# Patient Record
Sex: FEMALE | Race: BLACK OR AFRICAN AMERICAN | NOT HISPANIC OR LATINO | Employment: UNEMPLOYED | URBAN - METROPOLITAN AREA
[De-identification: names, ages, dates, MRNs, and addresses within clinical notes are randomized per-mention and may not be internally consistent; named-entity substitution may affect disease eponyms.]

---

## 2023-03-20 ENCOUNTER — TELEPHONE (OUTPATIENT)
Dept: PRIMARY CARE | Facility: CLINIC | Age: 63
End: 2023-03-20
Payer: COMMERCIAL

## 2023-03-20 DIAGNOSIS — B37.9 MONILIA INFECTION: Primary | ICD-10-CM

## 2023-03-20 DIAGNOSIS — R60.9 EDEMA, UNSPECIFIED TYPE: Primary | ICD-10-CM

## 2023-03-20 RX ORDER — NYSTATIN 100000 [USP'U]/G
POWDER TOPICAL 2 TIMES DAILY
Qty: 30 G | Refills: 1 | Status: SHIPPED | OUTPATIENT
Start: 2023-03-20 | End: 2024-03-19

## 2023-03-20 RX ORDER — FUROSEMIDE 20 MG/1
20 TABLET ORAL 2 TIMES DAILY
Qty: 60 TABLET | Refills: 0 | Status: SHIPPED | OUTPATIENT
Start: 2023-03-20 | End: 2024-03-19

## 2023-03-20 RX ORDER — AMLODIPINE BESYLATE 5 MG/1
5 TABLET ORAL 2 TIMES DAILY
COMMUNITY
Start: 2023-03-01

## 2023-03-20 RX ORDER — FUROSEMIDE 40 MG/1
1 TABLET ORAL DAILY
COMMUNITY
Start: 2022-06-07 | End: 2023-03-20 | Stop reason: ALTCHOICE

## 2023-03-20 RX ORDER — POTASSIUM CHLORIDE 1.5 G/1.58G
20 POWDER, FOR SOLUTION ORAL 2 TIMES DAILY
Qty: 60 PACKET | Refills: 0 | Status: SHIPPED | OUTPATIENT
Start: 2023-03-20 | End: 2024-03-19

## 2023-03-21 DIAGNOSIS — I10 HYPERTENSION, UNSPECIFIED TYPE: Primary | ICD-10-CM

## 2023-03-21 DIAGNOSIS — E03.9 HYPOTHYROIDISM, UNSPECIFIED TYPE: ICD-10-CM

## 2023-03-21 NOTE — PROGRESS NOTES
Subjective   Patient ID: Nkechi Hall is a 63 y.o. female who presents for No chief complaint on file..    HPI     Review of Systems    Objective   There were no vitals taken for this visit.    Physical Exam    Assessment/Plan

## 2023-03-23 PROBLEM — H04.123 DRY EYES: Status: ACTIVE | Noted: 2023-03-23

## 2023-03-23 PROBLEM — R07.89 ATYPICAL CHEST PAIN: Status: ACTIVE | Noted: 2023-03-23

## 2023-03-23 PROBLEM — I10 HYPERTENSION: Status: ACTIVE | Noted: 2023-03-23

## 2023-03-23 PROBLEM — R06.02 SHORTNESS OF BREATH ON EXERTION: Status: ACTIVE | Noted: 2023-03-23

## 2023-03-23 PROBLEM — J30.9 ALLERGIC RHINITIS: Status: ACTIVE | Noted: 2023-03-23

## 2023-03-23 PROBLEM — S16.1XXD NECK STRAIN, SUBSEQUENT ENCOUNTER: Status: ACTIVE | Noted: 2023-03-23

## 2023-03-23 PROBLEM — I87.8 VENOUS STASIS: Status: ACTIVE | Noted: 2023-03-23

## 2023-03-23 PROBLEM — R10.84 ABDOMINAL PAIN, CHRONIC, GENERALIZED: Status: ACTIVE | Noted: 2023-03-23

## 2023-03-23 PROBLEM — G89.29 CHRONIC PAIN: Status: ACTIVE | Noted: 2023-03-23

## 2023-03-23 PROBLEM — I70.0 ATHEROSCLEROSIS OF AORTA (CMS-HCC): Status: ACTIVE | Noted: 2023-03-23

## 2023-03-23 PROBLEM — M79.641 PAIN OF RIGHT HAND: Status: ACTIVE | Noted: 2023-03-23

## 2023-03-23 PROBLEM — N64.4 BREAST PAIN, LEFT: Status: ACTIVE | Noted: 2023-03-23

## 2023-03-23 PROBLEM — M54.17 LUMBOSACRAL NEURITIS: Status: ACTIVE | Noted: 2023-03-23

## 2023-03-23 PROBLEM — M10.9 GOUT: Status: ACTIVE | Noted: 2023-03-23

## 2023-03-23 PROBLEM — M25.519 SHOULDER PAIN: Status: ACTIVE | Noted: 2023-03-23

## 2023-03-23 PROBLEM — M79.89 SWELLING OF LOWER EXTREMITY: Status: ACTIVE | Noted: 2023-03-23

## 2023-03-23 PROBLEM — M54.50 LOW BACK PAIN: Status: ACTIVE | Noted: 2023-03-23

## 2023-03-23 PROBLEM — M96.1 POSTLAMINECTOMY SYNDROME OF LUMBOSACRAL REGION: Status: ACTIVE | Noted: 2023-03-23

## 2023-03-23 PROBLEM — M79.7 FIBROMYALGIA: Status: ACTIVE | Noted: 2023-03-23

## 2023-03-23 PROBLEM — E04.2 MULTINODULAR GOITER: Status: ACTIVE | Noted: 2023-03-23

## 2023-03-23 PROBLEM — K76.89 LIVER CYST: Status: ACTIVE | Noted: 2023-03-23

## 2023-03-23 PROBLEM — R07.89 CHEST PAIN, MUSCULOSKELETAL: Status: ACTIVE | Noted: 2023-03-23

## 2023-03-23 PROBLEM — J04.0 REFLUX LARYNGITIS: Status: ACTIVE | Noted: 2023-03-23

## 2023-03-23 PROBLEM — S69.91XA INJURY OF RIGHT HAND: Status: ACTIVE | Noted: 2023-03-23

## 2023-03-23 PROBLEM — N95.1 VAGINAL DRYNESS, MENOPAUSAL: Status: ACTIVE | Noted: 2023-03-23

## 2023-03-23 PROBLEM — K21.9 REFLUX LARYNGITIS: Status: ACTIVE | Noted: 2023-03-23

## 2023-03-23 PROBLEM — R06.09 DOE (DYSPNEA ON EXERTION): Status: ACTIVE | Noted: 2023-03-23

## 2023-03-23 PROBLEM — D57.3 SICKLE CELL TRAIT (CMS-HCC): Status: ACTIVE | Noted: 2023-03-23

## 2023-03-23 PROBLEM — M54.2 CERVICALGIA: Status: ACTIVE | Noted: 2023-03-23

## 2023-03-23 PROBLEM — I27.20 PULMONARY HYPERTENSION, MILD (MULTI): Status: ACTIVE | Noted: 2023-03-23

## 2023-03-23 PROBLEM — G89.29 ABDOMINAL PAIN, CHRONIC, GENERALIZED: Status: ACTIVE | Noted: 2023-03-23

## 2023-03-23 PROBLEM — E87.6 HYPOKALEMIA: Status: ACTIVE | Noted: 2023-03-23

## 2023-03-23 PROBLEM — M54.16 LEFT LUMBAR RADICULOPATHY: Status: ACTIVE | Noted: 2023-03-23

## 2023-03-23 PROBLEM — M17.0 PRIMARY OSTEOARTHRITIS OF BOTH KNEES: Status: ACTIVE | Noted: 2023-03-23

## 2023-03-23 PROBLEM — M26.649 TMJ ARTHRITIS: Status: ACTIVE | Noted: 2023-03-23

## 2023-03-23 PROBLEM — M75.81 TENDINITIS OF RIGHT ROTATOR CUFF: Status: ACTIVE | Noted: 2023-03-23

## 2023-03-23 PROBLEM — M48.02 CERVICAL STENOSIS OF SPINE: Status: ACTIVE | Noted: 2023-03-23

## 2023-03-23 PROBLEM — M19.90 OSTEOARTHRITIS: Status: ACTIVE | Noted: 2023-03-23

## 2023-03-23 PROBLEM — M96.1 LUMBAR POSTLAMINECTOMY SYNDROME: Status: ACTIVE | Noted: 2023-03-23

## 2023-03-23 PROBLEM — E01.0 THYROMEGALY: Status: ACTIVE | Noted: 2023-03-23

## 2023-03-23 PROBLEM — M25.569 JOINT PAIN, KNEE: Status: ACTIVE | Noted: 2023-03-23

## 2023-03-23 PROBLEM — R91.1 PULMONARY NODULE: Status: ACTIVE | Noted: 2023-03-23

## 2023-03-23 PROBLEM — R68.81 EARLY SATIETY: Status: ACTIVE | Noted: 2023-03-23

## 2023-03-23 PROBLEM — M89.8X1: Status: ACTIVE | Noted: 2023-03-23

## 2023-03-23 PROBLEM — S46.811A STRAIN OF RIGHT TRAPEZIUS MUSCLE: Status: ACTIVE | Noted: 2023-03-23

## 2023-03-23 PROBLEM — D64.9 ANEMIA: Status: ACTIVE | Noted: 2023-03-23

## 2023-03-23 PROBLEM — M54.16 RIGHT LUMBAR RADICULOPATHY: Status: ACTIVE | Noted: 2023-03-23

## 2023-03-23 PROBLEM — L30.9 DERMATITIS, ECZEMATOID: Status: ACTIVE | Noted: 2023-03-23

## 2023-03-23 PROBLEM — M62.81 MUSCLE WEAKNESS: Status: ACTIVE | Noted: 2023-03-23

## 2023-03-23 PROBLEM — M25.512 LEFT SHOULDER PAIN: Status: ACTIVE | Noted: 2023-03-23

## 2023-03-23 PROBLEM — R10.2 PELVIC PAIN: Status: ACTIVE | Noted: 2023-03-23

## 2023-03-23 PROBLEM — E78.5 HYPERLIPIDEMIA: Status: ACTIVE | Noted: 2023-03-23

## 2023-03-23 PROBLEM — K29.70 GASTRITIS: Status: ACTIVE | Noted: 2023-03-23

## 2023-03-23 PROBLEM — N64.89 BREAST ASYMMETRY IN FEMALE: Status: ACTIVE | Noted: 2023-03-23

## 2023-03-23 PROBLEM — S16.1XXA NECK MUSCLE STRAIN: Status: ACTIVE | Noted: 2023-03-23

## 2023-03-23 PROBLEM — L98.9 SKIN LESION: Status: ACTIVE | Noted: 2023-03-23

## 2023-03-23 PROBLEM — R10.32 SEVERE LEFT GROIN PAIN: Status: ACTIVE | Noted: 2023-03-23

## 2023-03-23 PROBLEM — K29.60 BILE REFLUX GASTRITIS: Status: ACTIVE | Noted: 2023-03-23

## 2023-03-23 PROBLEM — R59.0 SUBMENTAL ADENOPATHY: Status: ACTIVE | Noted: 2023-03-23

## 2023-03-23 PROBLEM — M51.26 LUMBAR HERNIATED DISC: Status: ACTIVE | Noted: 2023-03-23

## 2023-03-23 PROBLEM — R21 RASH: Status: ACTIVE | Noted: 2023-03-23

## 2023-03-23 RX ORDER — LIDOCAINE 50 MG/G
1 PATCH TOPICAL
COMMUNITY
Start: 2020-06-24

## 2023-03-23 RX ORDER — ATORVASTATIN CALCIUM 10 MG/1
1 TABLET, FILM COATED ORAL DAILY
COMMUNITY
Start: 2022-12-06

## 2023-03-23 RX ORDER — AMMONIUM LACTATE 12 G/100G
CREAM TOPICAL
COMMUNITY
Start: 2022-12-02

## 2023-03-23 RX ORDER — BETAMETHASONE DIPROPIONATE 0.5 MG/G
CREAM TOPICAL 2 TIMES DAILY
COMMUNITY
Start: 2022-05-26

## 2023-03-23 RX ORDER — TRAMADOL HYDROCHLORIDE 50 MG/1
1 TABLET ORAL 3 TIMES DAILY
COMMUNITY
Start: 2022-10-31

## 2023-03-23 RX ORDER — IBUPROFEN 600 MG/1
1 TABLET ORAL
COMMUNITY
Start: 2018-01-05

## 2023-03-23 RX ORDER — CYCLOBENZAPRINE HCL 5 MG
5 TABLET ORAL 3 TIMES DAILY
COMMUNITY

## 2023-03-23 RX ORDER — CETIRIZINE HYDROCHLORIDE 10 MG/1
10 TABLET ORAL NIGHTLY
COMMUNITY

## 2023-03-23 RX ORDER — COLCHICINE 0.6 MG/1
1.2 TABLET ORAL
COMMUNITY
Start: 2020-04-27

## 2023-03-23 RX ORDER — FLUOCINONIDE 0.5 MG/G
CREAM TOPICAL
COMMUNITY